# Patient Record
Sex: FEMALE | Race: WHITE
[De-identification: names, ages, dates, MRNs, and addresses within clinical notes are randomized per-mention and may not be internally consistent; named-entity substitution may affect disease eponyms.]

---

## 2018-06-19 ENCOUNTER — HOSPITAL ENCOUNTER (EMERGENCY)
Dept: HOSPITAL 68 - ERH | Age: 63
End: 2018-06-19
Payer: COMMERCIAL

## 2018-06-19 VITALS — WEIGHT: 102 LBS | HEIGHT: 63 IN | BODY MASS INDEX: 18.07 KG/M2

## 2018-06-19 VITALS — SYSTOLIC BLOOD PRESSURE: 116 MMHG | DIASTOLIC BLOOD PRESSURE: 78 MMHG
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
## 2018-06-19 DIAGNOSIS — S62.611A: Primary | ICD-10-CM
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
## 2018-06-19 NOTE — ED HAND/WRIST INJURY COMPLAINT
History of Present Illness
 
General
Chief Complaint: Hand or Wrist Injury
Stated Complaint: ?DISLOCATED FINGER
Source: patient
Exam Limitations: no limitations
 
Vital Signs & Intake/Output
Vital Signs & Intake/Output
 Vital Signs
 
 
Date Time Temp Pulse Resp B/P B/P Pulse O2 O2 Flow FiO2
 
     Mean Ox Delivery Rate 
 
 1401 98.2 90 18 119/75  98 Room Air  
 
 
 
Allergies
Coded Allergies:
No Known Allergies (18)
 
Triage Note:
64 YO FEMALE TO TRIAGE FOR EVAL OF L RING FINGER.
 STATES SHE WAS THROWING A BAIL OF HAY TO HER
 HORSES AND IT BENT HER FINGER SIDEWAYS. STATES
 HAPPENED YESTERDAY. NOTED WITH SWELLING AND
 BRUISING.
Triage Nurses Notes Reviewed? yes
Occurred: just prior to arrival
Duration: day(s):
Timing: recent history
Injury Environment: home
Severity: moderate, severe
Pain/Injury Location:
Left: 4th finger. 
No Modifying Factors: none
HPI:
63-year-old female comes into the emergency room for further evaluation of left 
fourth finger pain.  Pain is sharp and swollen.  Patient reports that she 
injured it while throwing some hay obarrels over the fence to feed her horses.  
Pain is sharp and throbbing.  Continuous.  Nonradiating.  Associated bruising.
 
Past History
 
Travel History
Traveled to [place] past 21 day No
 
Medical History
Any Pertinent Medical History? see below for history
Neurological: NONE
EENT: NONE
Cardiovascular: NONE
Respiratory: NONE
Gastrointestinal: NONE
Hepatic: NONE
Renal: NONE
Musculoskeletal: NONE
Psychiatric: NONE
Endocrine: NONE
Blood Disorders: NONE
Cancer(s): NONE
GYN/Reproductive: NONE
 
Surgical History
Surgical History: non-contributory
 
Psychosocial History
What is your primary language English
Tobacco Use: Current Daily Use
Daily Tobacco Use Amount/Type: =< 4 Cigarettes daily
 
Family History
Hx Contributory? No
 
Review of Systems
 
Review of Systems
Constitutional:
Reports: no symptoms. 
EENTM:
Reports: no symptoms. 
Respiratory:
Reports: no symptoms. 
Cardiovascular:
Reports: no symptoms. 
GI:
Reports: no symptoms. 
Genitourinary:
Reports: no symptoms. 
Musculoskeletal:
Reports: see HPI. 
Skin:
Reports: no symptoms. 
Neurological/Psychological:
Reports: no symptoms. 
Hematologic/Endocrine:
Reports: no symptoms. 
Immunologic/Allergic:
Reports: no symptoms. 
All Other Systems: Reviewed and Negative
 
Physical Exam
 
Physical Exam
General Appearance: well developed/nourished, mild distress
Head: atraumatic
Eyes:
Bilateral: normal appearance. 
Ears, Nose, Throat: normal ENT inspection, hearing grossly normal
Neck: normal inspection
Cardiovascular/Respiratory: no respiratory distress
Back: normal inspection
Hand Left: normal inspection (prox phaynx), ecchymosis, limited range of motion,
swelling, tender, 4th finger
Hand Right: normal inspection
Neurologic/Tendon: normal sensation, responds to pain, no evidence tendon injury
, no pulse deficit
Skin: intact, normal color, warm/dry
 
Progress
Differential Diagnosis: contusion, compartment syndrome, dislocation, fracture, 
sprain
Plan of Care:
 Orders
 
 
Procedure Date/time Status
 
XRY-HAND, 3 View LEFT 1400 Active
 
 
 
Diagnostic Imaging:
Viewed by Me: Radiology Read.  Discussed w/RAD: Radiology Read. 
Radiology Impression: PATIENT: TONNY NIEVES  MEDICAL RECORD NO: [id number] PRESENT
AGE: 63  PATIENT ACCOUNT NO: [id number] : 56  LOCATION: [facility] ORDERING 
PHYSICIAN: Jamie SHARP     SERVICE DATE: 18- EXAM TYPE: RAD - 
XRY-HAND, LEFT EXAMINATION: XR HAND, LEFT CLINICAL INFORMATION: Swelling and 
pain to the left hand at the fourth finger. COMPARISON: None TECHNIQUE: PA, 
lateral, and oblique views of the left hand. FINDINGS: There is a mildly 
comminuted fracture of the ring finger proximal phalangeal shaft near the base. 
No definite intra-articular involvement. There is apex lateral (radial) 
angulation at the fracture site with lateral displacement of the distal 
fragments by mild apex volar angulation is also present on the lateral view. 
Bones are osteopenic. Distortion of the fifth CMC joint may be due to an old 
healed fracture. Minimal osteoarthritis is present in the interphalangeal 
joints. IMPRESSION: Mildly comminuted and angulated fracture of the ring finger 
proximal phalanx DICTATED BY: Lenny Mccloud MD  DATE/TIME DICTATED:18 :RAD.MARTIN  DATE/TIME TRANSCRIBED:18 
CONFIDENTIAL, DO NOT COPY WITHOUT APPROPRIATE AUTHORIZATION.  <Electronically 
signed in Other Vendor System>                                                  
                                     SIGNED BY: Lenny Mccloud MD 18
 
Departure
 
Departure
Disposition: HOME OR SELF CARE
Condition: Stable
Clinical Impression
Primary Impression: Finger fracture, left
Referrals:
Tenzin TAPIA,Geo BRADLEY
 
Patient Has No Primary Care Dr (PCP/Family)
 
Additional Instructions:
Follow-up with orthopedic doctor provided.  Take ibuprofen for pain.  Stay in 
splint.  Return if any concerns worsening symptoms.
 
Please go over all results of today's visit with your primary care doctor.  
Contact your primary care doctor to let them know you were here in the emergency
room.  There may be nonspecific findings which may not be related to your visit 
today here in the emergency room but may require further evaluation and chronic 
monitoring by your primary care doctor.  If you had a laceration today the 
chance of foreign body always remains. You should follow-up with your primary 
care doctor for recheck in 3-5 days for a wound check.  If you had an x-ray done
there is a chance that a fracture could have been missed on initial read and you
should follow-up with your primary care doctor for repeat x-rays if symptoms 
persist.  If your blood pressure was elevated here in the emergency room please 
have rechecked by reba primary care doctor within the next 48.  If you were 
prescribed a narcotic here in the emergency room or any type of controlled 
substances you're not allowed to drive while taking this medication or operate 
any type of heavy machinery.  Narcotics can make you feel lightheaded dizziness 
nausea and can cause constipation.  You may need to  a stool softener.  
Thank you for choosing [facility] emergency room.  Please return to the 
emergency room immediately if you have any other concerns worsening of symptoms.
 
Departure Forms:
Customer Survey
General Discharge Information
Comments
2018 3:05:48 PM
 
Patient clinically looks well.  In no apparent distress.  Nontoxic-appearing.  
Patient put in a finger splint.  Follow-up with orthopedic doctor.
 
Procedures
 
Splinting
Location: Left fourth finger,
Manual Alignment Performed: No
Pre-Made Type: finger splint
Splint: finger splint
Splint Applied By: splint applied by me
Pre-Proc Neuro Vasc Exam: normal
Post-Proc Neuro Vasc Exam: normal

## 2018-06-19 NOTE — RADIOLOGY REPORT
EXAMINATION:
XR HAND, LEFT
 
CLINICAL INFORMATION:
Swelling and pain to the left hand at the fourth finger.
 
COMPARISON:
None
 
TECHNIQUE:
PA, lateral, and oblique views of the left hand.
 
FINDINGS:
There is a mildly comminuted fracture of the ring finger proximal phalangeal
shaft near the base. No definite intra-articular involvement. There is apex
lateral (radial) angulation at the fracture site with lateral displacement of
the distal fragments by mild apex volar angulation is also present on the
lateral view.
 
Bones are osteopenic. Distortion of the fifth CMC joint may be due to an old
healed fracture. Minimal osteoarthritis is present in the interphalangeal
joints.
 
IMPRESSION:
Mildly comminuted and angulated fracture of the ring finger proximal phalanx